# Patient Record
Sex: FEMALE | Race: BLACK OR AFRICAN AMERICAN | NOT HISPANIC OR LATINO | Employment: UNEMPLOYED | URBAN - METROPOLITAN AREA
[De-identification: names, ages, dates, MRNs, and addresses within clinical notes are randomized per-mention and may not be internally consistent; named-entity substitution may affect disease eponyms.]

---

## 2017-08-31 ENCOUNTER — CLINICAL SUPPORT (OUTPATIENT)
Dept: AUDIOLOGY | Facility: CLINIC | Age: 57
End: 2017-08-31
Payer: MEDICAID

## 2017-08-31 DIAGNOSIS — H90.8 HEARING LOSS, MIXED, UNILATERAL: Primary | ICD-10-CM

## 2017-08-31 PROCEDURE — 92557 COMPREHENSIVE HEARING TEST: CPT | Mod: PBBFAC,PO | Performed by: AUDIOLOGIST

## 2017-08-31 PROCEDURE — 92567 TYMPANOMETRY: CPT | Mod: PBBFAC,PO | Performed by: AUDIOLOGIST

## 2017-08-31 NOTE — PROGRESS NOTES
Brionna Tompkins was seen 08/31/2017 for an audiological evaluation.  Patient complains of hearing loss worse in her right ear for many years.  She does not know the cause.  She also reports a constant tinnitus in her right ear for the past 4 years.  Recently she feels unsteady and off balance.     Results reveal a moderate-to-profound mixed hearing loss 250-8000 Hz for the right ear, and  mild-to-moderately severe sensorineural hearing loss 250-8000 Hz for the left ear.   Speech Reception Thresholds were  50 dBHL for the right ear and 40 dBHL for the left ear.   Word recognition scores were good for the right ear and good for the left ear.   Tympanograms were Type Ad, hypermobile for the right ear and Type A, normal for the left ear.    Patient was counseled on the above findings.    Recommendations include:    1.  ENT followup  2.  Hearing aid consult (information was given to patient at today's visit)  3.  Wear hearing protective devices around loud noise  4.  Annual audiograms

## 2017-10-27 ENCOUNTER — TELEPHONE (OUTPATIENT)
Dept: AUDIOLOGY | Facility: CLINIC | Age: 57
End: 2017-10-27

## 2017-10-27 NOTE — TELEPHONE ENCOUNTER
Spoke to Ms. Tompkins. She will set up an appointment with ENT to obtain medical clearance for hearing aids. I also gave her the number for [facility]. She will contact them regarding the [facility] program.

## 2017-10-27 NOTE — TELEPHONE ENCOUNTER
----- Message from Kaleigh Gibson sent at 10/27/2017 12:38 PM CDT -----  Contact: pt  The pt wants to purchase a hearing aid and request a call to [phone]///[id number]